# Patient Record
Sex: FEMALE | Race: AMERICAN INDIAN OR ALASKA NATIVE
[De-identification: names, ages, dates, MRNs, and addresses within clinical notes are randomized per-mention and may not be internally consistent; named-entity substitution may affect disease eponyms.]

---

## 2019-01-02 ENCOUNTER — HOSPITAL ENCOUNTER (EMERGENCY)
Dept: HOSPITAL 5 - ED | Age: 54
Discharge: HOME | End: 2019-01-02
Payer: SELF-PAY

## 2019-01-02 VITALS — SYSTOLIC BLOOD PRESSURE: 130 MMHG | DIASTOLIC BLOOD PRESSURE: 68 MMHG

## 2019-01-02 DIAGNOSIS — F17.200: ICD-10-CM

## 2019-01-02 DIAGNOSIS — J20.9: Primary | ICD-10-CM

## 2019-01-02 DIAGNOSIS — Z21: ICD-10-CM

## 2019-01-02 DIAGNOSIS — E78.00: ICD-10-CM

## 2019-01-02 PROCEDURE — 99283 EMERGENCY DEPT VISIT LOW MDM: CPT

## 2019-01-02 PROCEDURE — 71046 X-RAY EXAM CHEST 2 VIEWS: CPT

## 2019-01-02 NOTE — EMERGENCY DEPARTMENT REPORT
- General


Chief Complaint: Upper Respiratory Infection


Stated Complaint: COUGHING


Time Seen by Provider: 01/02/19 17:36


Source: patient


Mode of arrival: Ambulatory


Limitations: No Limitations





- History of Present Illness


Initial Comments: 





53-year-old female has a history of HIV presents emergency department 

complaining of cough and congestion and bronchitis.  She was seen at the [facility] 2 day by nurse practitioner and diagnosed with seemed to be bronchitis 

and treated accordingly with doxycycline and albuterol.  She presents emergency 

department.  She states she was advised by the [facility] to come to the ER for

further evaluation and definitive management.  Patient didn't fill her 

prescriptions had not yet started them.  She reports no progression in her 

symptoms since March.  No hemoptysis, no hematemesis or hematochezia.  She 

denies any fever but has had cough with mucus production.


MD Complaint: cough


-: Gradual (2 days)


Severity: mild


Quality: aching


Consistency: constant


Worsens With: nothing


Associated Symptoms: rhinorrhea, nasal congestion, cough.  denies: myalgias, 

diaphoresis, abdominal pain, vomiting, diarrhea, right sweats, weight loss, 

epistaxis


Treatments Prior to Arrival: none





- Related Data


                                  Previous Rx's











 Medication  Instructions  Recorded  Last Taken  Type


 


guaiFENesin/CODEINE [Robitussin AC] 5 ml PO Q6H PRN #120 ml 01/02/19 Unknown Rx


 


predniSONE [Deltasone] 20 mg PO QDAY #5 tab 01/02/19 Unknown Rx











                                    Allergies











Allergy/AdvReac Type Severity Reaction Status Date / Time


 


No Known Allergies Allergy   Unverified 01/02/19 15:14














ED Review of Systems


ROS: 


Stated complaint: COUGHING


Other details as noted in HPI





Constitutional: denies: chills, fever


Eyes: denies: eye pain, eye discharge, vision change


ENT: congestion.  denies: ear pain, throat pain


Respiratory: cough.  denies: shortness of breath, wheezing


Cardiovascular: denies: chest pain, palpitations


Endocrine: no symptoms reported


Gastrointestinal: denies: abdominal pain, nausea, diarrhea


Genitourinary: denies: urgency, dysuria, discharge


Musculoskeletal: denies: back pain, joint swelling, arthralgia


Skin: denies: rash, lesions


Neurological: denies: headache, weakness, paresthesias


Psychiatric: denies: anxiety, depression


Hematological/Lymphatic: denies: easy bleeding, easy bruising





ED Past Medical Hx





- Past Medical History


Previous Medical History?: Yes


Hx HIV: Yes


Additional medical history: High cholesterol





- Surgical History


Past Surgical History?: No





- Social History


Smoking Status: Current Every Day Smoker


Substance Use Type: None





- Medications


Home Medications: 


                                Home Medications











 Medication  Instructions  Recorded  Confirmed  Last Taken  Type


 


guaiFENesin/CODEINE [Robitussin AC] 5 ml PO Q6H PRN #120 ml 01/02/19  Unknown Rx


 


predniSONE [Deltasone] 20 mg PO QDAY #5 tab 01/02/19  Unknown Rx














ED Physical Exam





- General


Limitations: No Limitations


General appearance: alert, in no apparent distress





- Head


Head exam: Present: atraumatic, normocephalic





- Eye


Eye exam: Present: normal appearance, PERRL, EOMI


Pupils: Present: normal accommodation





- ENT


ENT exam: Present: mucous membranes moist





- Neck


Neck exam: Present: normal inspection, full ROM





- Respiratory


Respiratory exam: Present: normal lung sounds bilaterally, wheezes, rhonchi.  

Absent: respiratory distress, chest wall tenderness, accessory muscle use





- Cardiovascular


Cardiovascular Exam: Present: regular rate, normal rhythm.  Absent: systolic 

murmur, diastolic murmur, rubs, gallop





- GI/Abdominal


GI/Abdominal exam: Present: soft, normal bowel sounds





- Extremities Exam


Extremities exam: Present: normal inspection, full ROM, normal capillary refill.

  Absent: pedal edema





- Back Exam


Back exam: Present: normal inspection





- Neurological Exam


Neurological exam: Present: alert, oriented X3





- Psychiatric


Psychiatric exam: Present: normal affect, normal mood





- Skin


Skin exam: Present: warm, dry, intact, normal color.  Absent: rash





ED Course





                                   Vital Signs











  01/02/19





  15:11


 


Temperature 98.9 F


 


Pulse Rate 96 H


 


Respiratory 18





Rate 


 


Blood Pressure 133/76


 


O2 Sat by Pulse 96





Oximetry 














ED Medical Decision Making





- Medical Decision Making





The paperwork [facility]. Eagle [facility].  Advised to follow-up 

with the [facility].  She reports no known history of asthma to her 

knowledge, there is no date.  Further follow-up will person for her to follow-up

 with or the actual reason for the follow-up.  I was unable to get her any 

further on this matter.  Advised about the [facility] advisor that he 

could possibly return to the free clinic to get clarification on is what her 

actual discharge expectations 


Critical care attestation.: 


If time is entered above; I have spent that time in minutes in the direct care 

of this critically ill patient, excluding procedure time.








ED Disposition


Clinical Impression: 


 Cough, Acute bronchitis





Disposition: DC-01 TO HOME OR SELFCARE


Is pt being admited?: No


Does the pt Need Aspirin: No


Condition: Stable


Instructions:  Acute Bronchitis (ED)


Referrals: 


PRIMARY CARE,MD [Primary Care Provider] - 3-5 Days


[facility] [Provider Group] - 3-5 Days

## 2019-01-02 NOTE — XRAY REPORT
FINAL REPORT



EXAM:  XR CHEST ROUTINE 2V



HISTORY:  cough 



TECHNIQUE:  Two views of the chest



Comparison: None



FINDINGS:  

Normal heart size.



Bronchial wall thickening with mild reticular coarsening of the bronchovascular interstitium.



Lateral film is mildly motion degraded.



No definite effusion.



Imaged axial skeleton demonstrates lower cervical degenerative facet osteoarthritis, otherwise unrema
rkable.



IMPRESSION:  

Findings compatible with bronchitis, chronicity is unknown.



No definite focal pneumonia. 



Probable underlying pulmonary emphysema. Correlate with smoking history. 



Lower cervical degenerative arthritis.